# Patient Record
Sex: FEMALE | Employment: FULL TIME | URBAN - METROPOLITAN AREA
[De-identification: names, ages, dates, MRNs, and addresses within clinical notes are randomized per-mention and may not be internally consistent; named-entity substitution may affect disease eponyms.]

---

## 2018-01-31 ENCOUNTER — OFFICE VISIT (OUTPATIENT)
Dept: FAMILY MEDICINE CLINIC | Age: 28
End: 2018-01-31

## 2018-01-31 ENCOUNTER — HOSPITAL ENCOUNTER (OUTPATIENT)
Dept: LAB | Age: 28
Discharge: HOME | End: 2018-01-31
Payer: COMMERCIAL

## 2018-01-31 VITALS
HEIGHT: 64 IN | RESPIRATION RATE: 17 BRPM | HEART RATE: 76 BPM | SYSTOLIC BLOOD PRESSURE: 111 MMHG | WEIGHT: 172 LBS | DIASTOLIC BLOOD PRESSURE: 68 MMHG | BODY MASS INDEX: 29.37 KG/M2 | TEMPERATURE: 98 F

## 2018-01-31 DIAGNOSIS — N63.0 FIBROUS BREAST LUMPS: ICD-10-CM

## 2018-01-31 DIAGNOSIS — N73.9 PID (PELVIC INFLAMMATORY DISEASE): Primary | ICD-10-CM

## 2018-01-31 DIAGNOSIS — N64.4 BREAST PAIN: ICD-10-CM

## 2018-01-31 DIAGNOSIS — N73.9 PID (PELVIC INFLAMMATORY DISEASE): ICD-10-CM

## 2018-01-31 DIAGNOSIS — N64.52 NIPPLE DISCHARGE: ICD-10-CM

## 2018-01-31 PROCEDURE — 87905 IA NZMTC ACTV OTH/THN VIRUS: CPT | Performed by: FAMILY MEDICINE

## 2018-01-31 RX ORDER — CEFTRIAXONE 500 MG/1
500 INJECTION, POWDER, FOR SOLUTION INTRAMUSCULAR; INTRAVENOUS ONCE
Qty: 1 VIAL | Refills: 0
Start: 2018-01-31 | End: 2018-01-31

## 2018-01-31 RX ORDER — DOXYCYCLINE 100 MG/1
100 CAPSULE ORAL 2 TIMES DAILY
Qty: 28 CAP | Refills: 0 | Status: SHIPPED | OUTPATIENT
Start: 2018-01-31 | End: 2018-02-14

## 2018-01-31 NOTE — PATIENT INSTRUCTIONS
Pelvic Inflammatory Disease: Care Instructions  Your Care Instructions    Pelvic inflammatory disease, or PID, is an infection of a woman's fallopian tubes and other reproductive organs. PID is usually caused by a sexually transmitted infection (STI), such as gonorrhea or chlamydia. PID can cause scars in the fallopian tubes, making it hard for a woman to get pregnant. Having one STI increases your risk for other STIs, such as genital herpes, genital warts, syphilis, and HIV. It is important to take all the medicine that was prescribed. PID can cause serious health problems if you do not complete your treatment. Follow-up care is a key part of your treatment and safety. Be sure to make and go to all appointments, and call your doctor if you are having problems. It's also a good idea to know your test results and keep a list of the medicines you take. How can you care for yourself at home? · Take your antibiotics as directed. Do not stop taking them just because you feel better. You need to take the full course of antibiotics. · Rest until your fever and pain have improved. · Take pain medicines exactly as directed. ¨ If the doctor gave you a prescription medicine for pain, take it as prescribed. ¨ If you are not taking a prescription pain medicine, ask your doctor if you can take an over-the-counter medicine. · Use a hot water bottle or a heating pad (set on low) on your belly for pain. · Do not douche. · Do not have sex or use tampons (you can use pads instead) until you have taken all the medicine, your pain is gone, and you feel completely well. · Talk to any sex partners you have had in the past 2 months. They need to be tested and may need to be treated for STIs. To prevent STIs  · Use latex condoms every time you have sex. Use them from the beginning to the end of sexual contact. · Talk to your partner before you have sex.  Find out if he or she has or is at risk for any sexually transmitted infection (STI). Keep in mind that a person may be able to spread an STI even if he or she does not have symptoms. · Do not have sex with anyone who has symptoms of an STI, such as sores on the genitals or mouth. · Having one sex partner (who does not have STIs and does not have sex with anyone else) is a good way to avoid STIs. When should you call for help? Call your doctor now or seek immediate medical care if:  ? · You have a new or higher fever. ? · You have unusual vaginal bleeding. ? · You have new or worse belly or pelvic pain. ? · You have vaginal discharge that has increased in amount or smells bad. ? Watch closely for changes in your health, and be sure to contact your doctor if:  ? · You do not get better as expected. Where can you learn more? Go to http://kaylah-arron.info/. Enter N294 in the search box to learn more about \"Pelvic Inflammatory Disease: Care Instructions. \"  Current as of: October 13, 2016  Content Version: 11.4  © 6653-0877 [employer]. Care instructions adapted under license by [employer] (which disclaims liability or warranty for this information). If you have questions about a medical condition or this instruction, always ask your healthcare professional. [address] any warranty or liability for your use of this information.

## 2018-01-31 NOTE — PROGRESS NOTES
New patient here to establish care.  Patient reports pelvic pain, painful intercourse, and breast pain

## 2018-01-31 NOTE — PROGRESS NOTES
Mali Apodaca    CC: EOC for Pelvic Pain    HPI:     Pelvic Pain:  - Started 2-3 weeks ago  - unchanged since it started  - Only occurs with sex. - Happens every time she has sex. - No vaginal bleeding  - Associated with discharge. Discharge is thick to watery. Occasionally has a bad order. Color is milky white to grey  - PMH of NG and CT when she was 27years old  - Partner with no STDs    Breast Tenderness:  - Started 2 to 3 weeks ago  - Bilateral  - Reports she will occasionally produce white to clear discharge from her nipples when she squeezes them  - Had a pregnancy test and it was negative  - Has not noticed any lumps in her breast, but does not check her breast.  - Has not noticed any skin changes  - IUD in place >3 years.  String cut due to LEEP.      ROS: Positive items marked in RED  CON: fever, chills, fatigue (2/2 work schedule)  HEENT: HA, ear pain, eye pain, sore throat  Cardiovascular: palpitations, CP  Resp: cough, SOB  Lymph/Heme: swollen/enlarged lymph nodes, tender/painful lymph nodes  GI: nausea, vomiting  SKIN: rash, itching, lesions, ulcers  : dysuria, hematuria, vaginal bleeding, vaginal discharge  MS: arthralgias, myalgias  Neuro: numbness, tingling, weakness  Psych: depression, anxiety      Past Medical History:   Diagnosis Date    Chlamydia 2011    Gonorrhea 2011    HPV in female        Past Surgical History:   Procedure Laterality Date    HX GYN      HX LEEP PROCEDURE         Family History   Problem Relation Age of Onset    Cancer Maternal Grandmother      breast       Social History     Social History    Marital status: UNKNOWN     Spouse name: N/A    Number of children: N/A    Years of education: N/A     Social History Main Topics    Smoking status: Never Smoker    Smokeless tobacco: Never Used    Alcohol use Yes      Comment: Occasionally    Drug use: No    Sexual activity: Yes     Partners: Male     Other Topics Concern    None     Social History Narrative    None       Allergies   Allergen Reactions    Amoxicillin Unknown (comments)     Patient reports she hasn't had this medication since she was a child      Penicillins Unknown (comments)         Current Outpatient Prescriptions:     INTRAUTERINE DEVICE, IUD, IU, by IntraUTERine route., Disp: , Rfl:     Physical Exam:      /68  Pulse 76  Temp 98 °F (36.7 °C) (Oral)   Resp 17  Ht 5' 4\" (1.626 m)  Wt 172 lb (78 kg)  BMI 29.52 kg/m2    General:  WD, WN, NAD  Eyes: sclera clear bilaterally, no discharge noted, eyelids normal in appearance  HENT: NCAT  Lungs: CTAB, Normal respiratory effort and rate  CV: RRR, no MRGs  ABD: soft, non-tender, non-distended, normal bowel sounds  Ext: no peripheral edema or digital cyanosis  Skin: normal temperature, turgor, color, and texture  Psych: alert and oriented to person, place and time, normal affect  Neuro: Speech normal, Moving all extremities, gait normal    BREAST: Breasts and nipples normal in appearance. Nipples pierced bilaterally. No nipple discharge or retraction noted. Skin with no pitting, erythema, or calor. Both breast with multiple firm and tender nodules. PELVIC: External genitalia normal in appearance. No lesions or ulcers. Vaginal walls normal in appearance. Thick white discharge noted with slight odor. Examination of cervix limited due to it being tilted and pain reported with speculum exam. Bimanual exam notable for bilateral adnexal tenderness and CMT.      Assessment/Plan     PID:  - Will treat empirically with ceftriaxone and doxycycline, given risks of complications  (Ex: chronic pelvic pain, infertility, ectopic pregnancy, etc...)  - Will check for BV, CT, NG, TV, and yeast  - Handout given on PID care  - Follow up in two weeks    Breast pain w/ nipple discharge and multiple breast lumps:  - Suspect discharge is normal and 2/2 patient squeezing her nipples  - Suspect patient has fibrocystic breast disease  - Will order bilateral mammogram to further assess, given uncertain prognosis/etiology        Irma Monteiro MD  1/31/2018, 11:37 AM

## 2018-01-31 NOTE — MR AVS SNAPSHOT
[address] [facility] [address] [facility] [address] [id number] 
[phone] Patient: Brennon Agarwal MRN: [id number] PQT:[id number] Visit Information Date & Time Provider Department Dept. Phone Encounter #  
 1/31/2018 11:00 AM Fabi Jacobs, [address] [place] [address] [id number] Follow-up Instructions Return in about 2 weeks (around 2/14/2018). Upcoming Health Maintenance Date Due  
 HPV AGE 9Y-34Y (1 of 3 - Female 3 Dose Series) 9/11/2003 DTaP/Tdap/Td series (1 - Tdap) 9/11/2013 PAP AKA CERVICAL CYTOLOGY 9/11/2013 Influenza Age 5 to Adult 8/1/2017 Allergies as of 1/31/2018  Never Reviewed Severity Noted Reaction Type Reactions Amoxicillin  01/31/2018    Unknown (comments) Patient reports she hasn't had this medication since she was a child Penicillins  01/31/2018    Unknown (comments) Current Immunizations  Reviewed on 1/31/2018 No immunizations on file. Reviewed by Skip Sosa LPN on 2/17/0739 at 81:78 AM  
You Were Diagnosed With   
  
 Codes Comments PID (pelvic inflammatory disease)    -  Primary ICD-10-CM: N73.9 ICD-9-CM: 614.9 Nipple discharge     ICD-10-CM: N64.52 
ICD-9-CM: 611.79 Breast pain     ICD-10-CM: N64.4 ICD-9-CM: 611.71 Vitals BP Pulse Temp Resp Height(growth percentile) Weight(growth percentile) 111/68 76 98 °F (36.7 °C) (Oral) 17 5' 4\" (1.626 m) 172 lb (78 kg) BMI OB Status Smoking Status 29.52 kg/m2 IUD Never Smoker Vitals History BMI and BSA Data Body Mass Index Body Surface Area  
 29.52 kg/m 2 1.88 m 2 Your Updated Medication List  
  
   
This list is accurate as of: 1/31/18 12:26 PM.  Always use your most recent med list.  
  
  
  
  
 cefTRIAXone 500 mg injection Commonly known as:  ROCEPHIN  
500 mg by IntraMUSCular route once for 1 dose. doxycycline 100 mg capsule Commonly known as:  Mee Failing Take 1 Cap by mouth two (2) times a day for 14 days. INTRAUTERINE DEVICE (IUD) IU  
by IntraUTERine route. Prescriptions Printed Refills  
 doxycycline (MONODOX) 100 mg capsule 0 Sig: Take 1 Cap by mouth two (2) times a day for 14 days. Class: Print Route: Oral  
  
We Performed the Following CEFTRIAXONE SODIUM INJECTION  MG [ [facility]] Comments:  
 Mix per protocol NC THER/PROPH/DIAG INJECTION, SUBCUT/IM [id number] CPT(R)] Follow-up Instructions Return in about 2 weeks (around 2/14/2018). To-Do List   
 01/31/2018 Imaging:  DE MAMMO BI DX INCL CAD Patient Instructions Pelvic Inflammatory Disease: Care Instructions Your Care Instructions Pelvic inflammatory disease, or PID, is an infection of a woman's fallopian tubes and other reproductive organs. PID is usually caused by a sexually transmitted infection (STI), such as gonorrhea or chlamydia. PID can cause scars in the fallopian tubes, making it hard for a woman to get pregnant. Having one STI increases your risk for other STIs, such as genital herpes, genital warts, syphilis, and HIV. It is important to take all the medicine that was prescribed. PID can cause serious health problems if you do not complete your treatment. Follow-up care is a key part of your treatment and safety. Be sure to make and go to all appointments, and call your doctor if you are having problems. It's also a good idea to know your test results and keep a list of the medicines you take. How can you care for yourself at home? · Take your antibiotics as directed. Do not stop taking them just because you feel better. You need to take the full course of antibiotics. · Rest until your fever and pain have improved. · Take pain medicines exactly as directed. ¨ If the doctor gave you a prescription medicine for pain, take it as prescribed. ¨ If you are not taking a prescription pain medicine, ask your doctor if you can take an over-the-counter medicine. · Use a hot water bottle or a heating pad (set on low) on your belly for pain. · Do not douche. · Do not have sex or use tampons (you can use pads instead) until you have taken all the medicine, your pain is gone, and you feel completely well. · Talk to any sex partners you have had in the past 2 months. They need to be tested and may need to be treated for STIs. To prevent STIs · Use latex condoms every time you have sex. Use them from the beginning to the end of sexual contact. · Talk to your partner before you have sex. Find out if he or she has or is at risk for any sexually transmitted infection (STI). Keep in mind that a person may be able to spread an STI even if he or she does not have symptoms. · Do not have sex with anyone who has symptoms of an STI, such as sores on the genitals or mouth. · Having one sex partner (who does not have STIs and does not have sex with anyone else) is a good way to avoid STIs. When should you call for help? Call your doctor now or seek immediate medical care if: 
? · You have a new or higher fever. ? · You have unusual vaginal bleeding. ? · You have new or worse belly or pelvic pain. ? · You have vaginal discharge that has increased in amount or smells bad. ? Watch closely for changes in your health, and be sure to contact your doctor if: 
? · You do not get better as expected. Where can you learn more? Go to http://kaylah-arron.info/. Enter N294 in the search box to learn more about \"Pelvic Inflammatory Disease: Care Instructions. \" Current as of: October 13, 2016 Content Version: 11.4 © 4621-4383 [employer]. Care instructions adapted under license by [employer] (which disclaims liability or warranty for this information).  If you have questions about a medical condition or this instruction, always ask your healthcare professional. [address]  any warranty or liability for your use of this information. Introducing [facility]! [facility] introduces [employer] patient portal. Now you can access parts of your medical record, email your doctor's office, and request medication refills online. 1. In your internet browser, go to [email or web address] 2. Click on the First Time User? Click Here link in the Sign In box. You will see the New Member Sign Up page. 3. Enter your [employer] Access Code exactly as it appears below. You will not need to use this code after youve completed the sign-up process. If you do not sign up before the expiration date, you must request a new code. · [employer] Access Code: [email or web address] Expires: 5/1/2018 12:22 PM 
 
4. Enter the last four digits of your Social Security Number (xxxx) and Date of Birth (mm/dd/yyyy) as indicated and click Submit. You will be taken to the next sign-up page. 5. Create a [employer] ID. This will be your [employer] login ID and cannot be changed, so think of one that is secure and easy to remember. 6. Create a [employer] password. You can change your password at any time. 7. Enter your Password Reset Question and Answer. This can be used at a later time if you forget your password. 8. Enter your e-mail address. You will receive e-mail notification when new information is available in [address] 9. Click Sign Up. You can now view and download portions of your medical record. 10. Click the Download Summary menu link to download a portable copy of your medical information. If you have questions, please visit the Frequently Asked Questions section of the [employer] website. Remember, [employer] is NOT to be used for urgent needs. For medical emergencies, dial 911. Now available from your iPhone and Android! Please provide this summary of care documentation to your next provider. If you have any questions after today's visit, please call [phone].

## 2018-02-02 ENCOUNTER — HOSPITAL ENCOUNTER (OUTPATIENT)
Dept: MAMMOGRAPHY | Age: 28
Discharge: HOME | End: 2018-02-02
Attending: FAMILY MEDICINE
Payer: COMMERCIAL

## 2018-02-02 ENCOUNTER — HOSPITAL ENCOUNTER (OUTPATIENT)
Dept: ULTRASOUND IMAGING | Age: 28
Discharge: HOME | End: 2018-02-02
Attending: FAMILY MEDICINE
Payer: COMMERCIAL

## 2018-02-02 DIAGNOSIS — N63.10 LUMP OF BREAST, RIGHT: ICD-10-CM

## 2018-02-02 DIAGNOSIS — N64.52 NIPPLE DISCHARGE: ICD-10-CM

## 2018-02-02 DIAGNOSIS — N63.20 LUMP OF BREAST, LEFT: ICD-10-CM

## 2018-02-02 DIAGNOSIS — N64.4 BREAST PAIN: ICD-10-CM

## 2018-02-02 DIAGNOSIS — N64.52 DISCHARGE FROM LEFT NIPPLE: ICD-10-CM

## 2018-02-02 PROCEDURE — 76642 ULTRASOUND BREAST LIMITED: CPT

## 2018-02-03 LAB
BACTERIAL SIALIDASE SPEC QL: POSITIVE
C TRACH RRNA SPEC QL NAA+PROBE: NEGATIVE
N GONORRHOEA RRNA SPEC QL NAA+PROBE: NEGATIVE
SPECIMEN SOURCE: ABNORMAL
T VAGINALIS RRNA SPEC QL NAA+PROBE: NEGATIVE
YEAST GENITAL QL CULT: NEGATIVE

## 2018-02-05 NOTE — PROGRESS NOTES
Results discussed with the patient. Reports her pelvic pain appears to have fully resolved with the antibiotic regimen. Discussed wether she was interested in adjusting her current antibiotic regimen to target specifically BV. Patient does not wish to have her antibiotics adjusted, given her current improvement.

## 2020-06-10 PROBLEM — Z00.00 ENCOUNTER FOR PREVENTIVE HEALTH EXAMINATION: Status: ACTIVE | Noted: 2020-06-10
